# Patient Record
Sex: MALE | Race: WHITE | NOT HISPANIC OR LATINO | Employment: FULL TIME | URBAN - METROPOLITAN AREA
[De-identification: names, ages, dates, MRNs, and addresses within clinical notes are randomized per-mention and may not be internally consistent; named-entity substitution may affect disease eponyms.]

---

## 2022-08-27 ENCOUNTER — HOSPITAL ENCOUNTER (EMERGENCY)
Facility: HOSPITAL | Age: 62
Discharge: HOME | End: 2022-08-27
Attending: EMERGENCY MEDICINE
Payer: COMMERCIAL

## 2022-08-27 VITALS
TEMPERATURE: 98 F | RESPIRATION RATE: 19 BRPM | OXYGEN SATURATION: 93 % | BODY MASS INDEX: 34.13 KG/M2 | DIASTOLIC BLOOD PRESSURE: 75 MMHG | SYSTOLIC BLOOD PRESSURE: 137 MMHG | HEART RATE: 94 BPM | WEIGHT: 252 LBS | HEIGHT: 72 IN

## 2022-08-27 DIAGNOSIS — T63.441A BEE STING: Primary | ICD-10-CM

## 2022-08-27 DIAGNOSIS — T63.441A ALLERGIC REACTION TO BEE STING: ICD-10-CM

## 2022-08-27 PROCEDURE — 99282 EMERGENCY DEPT VISIT SF MDM: CPT

## 2022-08-27 PROCEDURE — 96372 THER/PROPH/DIAG INJ SC/IM: CPT

## 2022-08-27 PROCEDURE — 99284 EMERGENCY DEPT VISIT MOD MDM: CPT | Performed by: EMERGENCY MEDICINE

## 2022-08-27 RX ORDER — DEXAMETHASONE SODIUM PHOSPHATE 10 MG/ML
10 INJECTION, SOLUTION INTRAMUSCULAR; INTRAVENOUS ONCE
Status: COMPLETED | OUTPATIENT
Start: 2022-08-27 | End: 2022-08-27

## 2022-08-27 RX ORDER — DIPHENHYDRAMINE HCL 25 MG
50 TABLET ORAL ONCE
Status: COMPLETED | OUTPATIENT
Start: 2022-08-27 | End: 2022-08-27

## 2022-08-27 RX ORDER — PREDNISONE 50 MG/1
50 TABLET ORAL DAILY
Qty: 3 TABLET | Refills: 0 | Status: SHIPPED | OUTPATIENT
Start: 2022-08-27

## 2022-08-27 RX ORDER — EPINEPHRINE 1 MG/ML
0.3 INJECTION, SOLUTION, CONCENTRATE INTRAVENOUS ONCE
Status: COMPLETED | OUTPATIENT
Start: 2022-08-27 | End: 2022-08-27

## 2022-08-27 RX ORDER — EPINEPHRINE 0.3 MG/.3ML
0.3 INJECTION SUBCUTANEOUS ONCE
Qty: 0.6 ML | Refills: 2 | Status: SHIPPED | OUTPATIENT
Start: 2022-08-27 | End: 2022-08-27

## 2022-08-27 RX ADMIN — DIPHENHYDRAMINE HYDROCHLORIDE 50 MG: 25 TABLET ORAL at 18:53

## 2022-08-27 RX ADMIN — EPINEPHRINE 0.3 MG: 1 INJECTION, SOLUTION, CONCENTRATE INTRAVENOUS at 18:55

## 2022-08-27 RX ADMIN — DEXAMETHASONE SODIUM PHOSPHATE 10 MG: 10 INJECTION INTRAMUSCULAR; INTRAVENOUS at 18:54

## 2022-08-27 NOTE — ED NOTES
Pt stung by bee earlierr this am,  No resp distress , as day went on r arm is swollen      Sancho Berrios, AMEENA  08/27/22 1627

## 2022-08-28 NOTE — ED PROVIDER NOTES
History  Chief Complaint   Patient presents with    Bee Sting     Bee sting around 8 am this morning on right hand  States "anaphylaxis reaction to bee stings" Took benadryl  Now noticed right hand is swollen  This patient presents emergency department for the evaluation and treatment of right arm pain after recent bee sting  He was stung in the morning hours and has diffuse swelling and redness over the right upper arm  No short shortness of breath no swallowing problems  No chest pain or wheezing  Symptoms have been progressive moderate to severe and constant  Past medical history of diabetes  Patient smokes does not drink      History provided by:  Patient   used: No        None       Past Medical History:   Diagnosis Date    Diabetes mellitus ([facility] [address] )        Past Surgical History:   Procedure Laterality Date    CARDIAC SURGERY         History reviewed  No pertinent family history  I have reviewed and agree with the history as documented  E-Cigarette/Vaping     E-Cigarette/Vaping Substances     Social History     Tobacco Use    Smoking status: Current Every Day Smoker    Smokeless tobacco: Never Used   Substance Use Topics    Alcohol use: Not Currently    Drug use: Not Currently       Review of Systems   Constitutional: Negative for chills and fever  HENT: Negative for ear pain and sore throat  Eyes: Negative for pain and visual disturbance  Respiratory: Negative for cough and shortness of breath  Cardiovascular: Negative for chest pain and palpitations  Gastrointestinal: Negative for abdominal pain and vomiting  Genitourinary: Negative for dysuria and hematuria  Musculoskeletal: Negative for arthralgias and back pain  Skin: Negative for rash  Neurological: Negative for seizures and syncope  All other systems reviewed and are negative  Physical Exam  Physical Exam  Vitals and nursing note reviewed     Constitutional:       Appearance: Normal appearance  He is well-developed  HENT:      Head: Normocephalic and atraumatic  Eyes:      Extraocular Movements: Extraocular movements intact  Conjunctiva/sclera: Conjunctivae normal       Pupils: Pupils are equal, round, and reactive to light  Cardiovascular:      Rate and Rhythm: Normal rate and regular rhythm  Heart sounds: No murmur heard  Pulmonary:      Effort: Pulmonary effort is normal  No respiratory distress  Breath sounds: Normal breath sounds  Abdominal:      General: Abdomen is flat  Palpations: Abdomen is soft  Tenderness: There is no abdominal tenderness  Musculoskeletal:      Cervical back: Neck supple  Comments: RUE swelling, redness  Tender  No  Hives present   Skin:     General: Skin is warm and dry  Capillary Refill: Capillary refill takes less than 2 seconds  Neurological:      General: No focal deficit present  Mental Status: He is alert and oriented to person, place, and time     Psychiatric:         Mood and Affect: Mood normal          Behavior: Behavior normal          Vital Signs  ED Triage Vitals [08/27/22 1827]   Temperature Pulse Respirations Blood Pressure SpO2   98 °F (36 7 °C) 94 19 137/75 93 %      Temp Source Heart Rate Source Patient Position - Orthostatic VS BP Location FiO2 (%)   Tympanic Monitor Sitting Left arm --      Pain Score       --           Vitals:    08/27/22 1827   BP: 137/75   Pulse: 94   Patient Position - Orthostatic VS: Sitting         Visual Acuity      ED Medications  Medications   dexamethasone (PF) (DECADRON) injection 10 mg (10 mg Intramuscular Given 8/27/22 1854)   EPINEPHrine PF (ADRENALIN) 1 mg/mL injection 0 3 mg (0 3 mg Intramuscular Given 8/27/22 1855)   diphenhydrAMINE (BENADRYL) tablet 50 mg (50 mg Oral Given 8/27/22 1853)       Diagnostic Studies  Results Reviewed     None                 No orders to display              Procedures  Procedures         ED Course SBIRT 20yo+    Flowsheet Row Most Recent Value   SBIRT (23 yo +)    In order to provide better care to our patients, we are screening all of our patients for alcohol and drug use  Would it be okay to ask you these screening questions? No Filed at: 08/27/2022 1834                    [facility]  Number of Diagnoses or Management Options  Risk of Complications, Morbidity, and/or Mortality  General comments: Pt clinically stable and well  Stable v/s (normal)    Went to re-evaluate patient at 2014 and they had left without letting the RN know    Patient Progress  Patient progress: stable      Disposition  Final diagnoses:   Bee sting   Allergic reaction to bee sting     Time reflects when diagnosis was documented in both MDM as applicable and the Disposition within this note     Time User Action Codes Description Comment    8/27/2022  8:17 PM Coretta Sandy Add [V52 102B] Bee sting     8/27/2022  8:17 PM [address] Christopher Crespo Add [Q46 029C] Allergic reaction to bee sting       ED Disposition     ED Disposition   AMA    Condition   --    Date/Time   Sat Aug 27, 2022  8:18 PM    Comment   Date: 8/27/2022  Patient: Marivel Fatima [address]  Admitted: 8/27/2022  6:32 PM  Attending Provider: Jennifer Brown MD    [address] [place] [address] or his authorized caregiver has made the decision for the patient to leave the emergency department against the advice o f the emergency department staff  He or his authorized caregiver has been informed and understands the inherent risks, including death,  He or his authorized caregiver has decided to accept the responsibility for this decision  Marivel Reed and a ll necessary parties have been advised that he may return for further evaluation or treatment  His condition at time of discharge was stable    [address] [place] [address] had current vital signs as follows:  /75 (BP Location: Left arm)   Pulse 94   Temp  98 °F (36 7 °C) (Tympanic)   Resp 19   Ht 6' (1 829 m)   Wt 114 kg (252 lb) Follow-up Information    None         There are no discharge medications for this patient  No discharge procedures on file      PDMP Review     None          ED Provider  Electronically Signed by           Sarah Zimmer MD  08/31/22 2379

## 2024-12-26 ENCOUNTER — OFFICE VISIT (OUTPATIENT)
Dept: URGENT CARE | Facility: CLINIC | Age: 64
End: 2024-12-26
Payer: MEDICARE

## 2024-12-26 VITALS
BODY MASS INDEX: 33.91 KG/M2 | TEMPERATURE: 98 F | RESPIRATION RATE: 19 BRPM | OXYGEN SATURATION: 97 % | WEIGHT: 250 LBS | HEART RATE: 75 BPM | SYSTOLIC BLOOD PRESSURE: 138 MMHG | DIASTOLIC BLOOD PRESSURE: 88 MMHG

## 2024-12-26 DIAGNOSIS — M54.41 ACUTE BILATERAL LOW BACK PAIN WITH RIGHT-SIDED SCIATICA: Primary | ICD-10-CM

## 2024-12-26 PROBLEM — E66.9 OBESITY WITH BODY MASS INDEX (BMI) OF 30.0 TO 39.9: Status: ACTIVE | Noted: 2024-12-26

## 2024-12-26 PROBLEM — K42.9 UMBILICAL HERNIA WITHOUT OBSTRUCTION AND WITHOUT GANGRENE: Status: ACTIVE | Noted: 2024-12-26

## 2024-12-26 PROBLEM — E11.9 TYPE 2 DIABETES MELLITUS WITH HEMOGLOBIN A1C GOAL OF LESS THAN 7.0% (HCC): Chronic | Status: ACTIVE | Noted: 2019-08-29

## 2024-12-26 PROCEDURE — G0463 HOSPITAL OUTPT CLINIC VISIT: HCPCS | Performed by: NURSE PRACTITIONER

## 2024-12-26 PROCEDURE — 99213 OFFICE O/P EST LOW 20 MIN: CPT | Performed by: NURSE PRACTITIONER

## 2024-12-26 RX ORDER — PREDNISONE 20 MG/1
40 TABLET ORAL DAILY
Qty: 10 TABLET | Refills: 0 | Status: SHIPPED | OUTPATIENT
Start: 2024-12-26 | End: 2024-12-31

## 2024-12-26 RX ORDER — ATORVASTATIN CALCIUM 10 MG/1
10 TABLET, FILM COATED ORAL DAILY
COMMUNITY
Start: 2024-08-28

## 2024-12-26 RX ORDER — CYCLOBENZAPRINE HCL 5 MG
5 TABLET ORAL
Qty: 10 TABLET | Refills: 0 | Status: SHIPPED | OUTPATIENT
Start: 2024-12-26

## 2024-12-26 RX ORDER — SEMAGLUTIDE 0.68 MG/ML
INJECTION, SOLUTION SUBCUTANEOUS
COMMUNITY
Start: 2024-07-24

## 2024-12-26 NOTE — LETTER
December 26, 2024     Patient: Adin Reed   YOB: 1960   Date of Visit: 12/26/2024       To Whom it May Concern:    Adin Reed was seen in my clinic on 12/26/2024. He may return to work on 1/2/2024 or sooner if symptoms improve .    If you have any questions or concerns, please don't hesitate to call.         Sincerely,          CAROLINA Quarles        CC: No Recipients

## 2024-12-26 NOTE — PATIENT INSTRUCTIONS
Ice or heat to affected area: 15 minutes every 3 hours as needed throughout the day. May alternate ice and heat  Topical pain medication such as icy/hot, Biofreeze, Salon pas, etc.  Ibuprofen - 600 mg orally every 6-8 hours when required, maximum 2400 mg/day OR Naproxen - 500 mg orally twice daily when required, maximum 1250 mg/day    Acetaminophen - 650 mg orally every 4-6 hours when required, maximum 4000 mg/day  Make sure you are taking the medication with food.   Do not drive or operate heavy machinery while taking any muscle relaxers  Stretching exercises    Consider physical therapy if no improvement after 1 week  F/u with PCP in 1 week if pain is not improving  Follow up with chiropractic or orthopedic if continues >1 month  Report to the ER with worsening pain, lower extremity numbness/tingling/weakness or loss of bowel/bladder function.     If tests have been performed at [facility], our office will contact you with results if changes need to be made to the care plan discussed with you at the visit.  [facility]  Follow up with PCP in 3-5 days.  Proceed to  ER if symptoms worsen.

## 2024-12-26 NOTE — PROGRESS NOTES
[facility]        NAME: Adin Reed is a 64 y.o. male  : 1960    MRN: [id number]  DATE: 2024  TIME: 5:54 PM    Assessment and Plan   Acute bilateral low back pain with right-sided sciatica [M54.41]  1. Acute bilateral low back pain with right-sided sciatica  predniSONE 20 mg tablet    cyclobenzaprine (FLEXERIL) 5 mg tablet    Ambulatory Referral to Comprehensive [facility]        Advised to continue naproxen as directed. Can start prednisone mornings and flexeril hs. Patient has [employer] license advised not to drive while taking medications, he is on leave until after the holidays. Follow up comprehensive spine program if pain persists, no known triggering event to cause pains.     Patient Instructions       Follow up with PCP in 3-5 days.  Proceed to  ER if symptoms worsen.    If tests are performed, our office will contact you with results only if changes need to made to the care plan discussed with you at the visit. [facility]    Chief Complaint     Chief Complaint   Patient presents with    Back Pain     Lower back pain, not sure why, Aleve, Try Voltaren  pain  for 2 days,          History of Present Illness       Sitting to stand pain is worse, hard to fully straighten out the back.     Back Pain  This is a new problem. The current episode started in the past 7 days (2 days). The pain is present in the lumbar spine. The symptoms are aggravated by bending, position and twisting. Pertinent negatives include no abdominal pain, bladder incontinence, bowel incontinence, chest pain, dysuria, fever, headaches, leg pain, numbness, paresis, paresthesias, pelvic pain, perianal numbness, tingling, weakness or weight loss. Risk factors include history of cancer. He has tried NSAIDs and analgesics for the symptoms. The treatment provided mild relief.       Review of Systems   Review of Systems   Constitutional:  Negative for chills, fever and weight  loss.   Cardiovascular:  Negative for chest pain.   Gastrointestinal:  Negative for abdominal pain and bowel incontinence.   Genitourinary:  Negative for bladder incontinence, dysuria and pelvic pain.   Musculoskeletal:  Positive for arthralgias and back pain. Negative for neck pain and neck stiffness.   Skin:  Positive for rash. Negative for color change, pallor and wound.   Neurological:  Negative for tingling, weakness, numbness, headaches and paresthesias.   Psychiatric/Behavioral:  Positive for sleep disturbance.          Current Medications       Current Outpatient Medications:     atorvastatin (LIPITOR) 10 mg tablet, Take 10 mg by mouth daily, Disp: , Rfl:     cyclobenzaprine (FLEXERIL) 5 mg tablet, Take 1 tablet (5 mg total) by mouth daily at bedtime, Disp: 10 tablet, Rfl: 0    metFORMIN (GLUCOPHAGE) 1000 MG tablet, Take 1 tablet by mouth 2 (two) times a day with meals, Disp: , Rfl:     Ozempic, 0.25 or 0.5 MG/DOSE, 2 MG/3ML injection pen, inject 0.5mg under the skin once weekly as directed. stop annia, Disp: , Rfl:     predniSONE 20 mg tablet, Take 2 tablets (40 mg total) by mouth daily for 5 days, Disp: 10 tablet, Rfl: 0    EPINEPHrine (EPIPEN) 0.3 mg/0.3 mL SOAJ, Inject 0.3 mL (0.3 mg total) into a muscle once for 1 dose, Disp: 0.6 mL, Rfl: 2    predniSONE 50 mg tablet, Take 1 tablet (50 mg total) by mouth daily (Patient not taking: Reported on 12/26/2024), Disp: 3 tablet, Rfl: 0    Current Allergies     Allergies as of 12/26/2024 - Reviewed 12/26/2024   Allergen Reaction Noted    Bee venom Anaphylaxis 06/11/2016    Wasp venom Anaphylaxis 08/29/2019            The following portions of the patient's history were reviewed and updated as appropriate: allergies, current medications, past family history, past medical history, past social history, past surgical history and problem list.     Past Medical History:   Diagnosis Date    Diabetes mellitus (HCC)        Past Surgical History:   Procedure Laterality  Date    CARDIAC SURGERY         No family history on file.      Medications have been verified.        Objective   /88   Pulse 75   Temp 98 °F (36.7 °C)   Resp 19   Wt 113 kg (250 lb)   SpO2 97%   BMI 33.91 kg/m²        Physical Exam     Physical Exam  Vitals and nursing note reviewed.   Constitutional:       General: He is not in acute distress.     Appearance: Normal appearance. He is not ill-appearing.   HENT:      Head: Normocephalic and atraumatic.   Cardiovascular:      Rate and Rhythm: Normal rate and regular rhythm.      Heart sounds: Normal heart sounds, S1 normal and S2 normal.   Pulmonary:      Effort: Pulmonary effort is normal. No accessory muscle usage.      Breath sounds: Normal breath sounds. No wheezing.   Musculoskeletal:      Cervical back: Normal.      Thoracic back: Normal.      Lumbar back: Spasms and tenderness present. Decreased range of motion.   Skin:     General: Skin is warm and dry.      Capillary Refill: Capillary refill takes less than 2 seconds.      Findings: No rash.   Neurological:      General: No focal deficit present.      Mental Status: He is alert and oriented to person, place, and time.      Motor: Motor function is intact.   Psychiatric:         Attention and Perception: Attention and perception normal.         Mood and Affect: Mood and affect normal.         Speech: Speech normal.         Behavior: Behavior normal. Behavior is cooperative.         Thought Content: Thought content normal.